# Patient Record
Sex: MALE | Race: WHITE | NOT HISPANIC OR LATINO | Employment: OTHER | URBAN - METROPOLITAN AREA
[De-identification: names, ages, dates, MRNs, and addresses within clinical notes are randomized per-mention and may not be internally consistent; named-entity substitution may affect disease eponyms.]

---

## 2019-11-04 ENCOUNTER — IMMUNIZATION (OUTPATIENT)
Dept: SOCIAL WORK | Facility: CLINIC | Age: 54
End: 2019-11-04
Payer: COMMERCIAL

## 2019-11-04 DIAGNOSIS — Z23 NEED FOR VACCINATION: ICD-10-CM

## 2019-11-04 PROCEDURE — 90686 IIV4 VACC NO PRSV 0.5 ML IM: CPT | Performed by: REGISTERED NURSE

## 2019-11-04 PROCEDURE — 90471 IMMUNIZATION ADMIN: CPT | Performed by: REGISTERED NURSE

## 2022-12-19 ENCOUNTER — HOSPITAL ENCOUNTER (OUTPATIENT)
Dept: RADIOLOGY | Facility: MEDICAL CENTER | Age: 57
End: 2022-12-19

## 2022-12-19 ENCOUNTER — HOSPITAL ENCOUNTER (OUTPATIENT)
Dept: RADIOLOGY | Facility: MEDICAL CENTER | Age: 57
End: 2022-12-19
Attending: NEUROLOGICAL SURGERY

## 2022-12-19 ENCOUNTER — HOSPITAL ENCOUNTER (OUTPATIENT)
Dept: RADIATION ONCOLOGY | Facility: MEDICAL CENTER | Age: 57
End: 2022-12-31
Attending: RADIOLOGY
Payer: COMMERCIAL

## 2022-12-19 VITALS
DIASTOLIC BLOOD PRESSURE: 77 MMHG | BODY MASS INDEX: 31.62 KG/M2 | OXYGEN SATURATION: 96 % | SYSTOLIC BLOOD PRESSURE: 133 MMHG | HEIGHT: 70 IN | TEMPERATURE: 97.2 F | HEART RATE: 73 BPM | WEIGHT: 220.9 LBS

## 2022-12-19 DIAGNOSIS — D32.0 MENINGIOMA, CEREBRAL (HCC): ICD-10-CM

## 2022-12-19 DIAGNOSIS — D32.9 MENINGIOMA (HCC): ICD-10-CM

## 2022-12-19 PROCEDURE — 99205 OFFICE O/P NEW HI 60 MIN: CPT | Performed by: RADIOLOGY

## 2022-12-19 PROCEDURE — 99214 OFFICE O/P EST MOD 30 MIN: CPT | Performed by: RADIOLOGY

## 2022-12-19 RX ORDER — ASPIRIN 81 MG/1
TABLET ORAL DAILY
COMMUNITY

## 2022-12-19 RX ORDER — LYSINE HCL 500 MG
500 TABLET ORAL DAILY
COMMUNITY

## 2022-12-19 RX ORDER — B-COMPLEX WITH VITAMIN C
1 TABLET ORAL DAILY
COMMUNITY

## 2022-12-19 ASSESSMENT — PAIN SCALES - GENERAL: PAINLEVEL: NO PAIN

## 2022-12-19 ASSESSMENT — FIBROSIS 4 INDEX: FIB4 SCORE: 0.7

## 2022-12-19 NOTE — CONSULTS
RADIATION ONCOLOGY CONSULT    DATE OF SERVICE: 12/19/2022    IDENTIFICATION: A 57 y.o. male with likely G1 meningioma of left frontal cortex    He is here at the kind request of Dr. Schaffer    HISTORY OF PRESENT ILLNESS:   Patient originally presented with some vertigo symptoms and had MRI done which showed a left frontal enhancing lesion and type T2 hyperintense in the left frontal cortex.  Patient had surveillance done and had interval MRI done November 2022 which showed growth from 1.5 cm to 2 cm.  Patient was seen by Dr. Schaffer who felt it was in an unsafe location given proximity to motor [facility].  Given growth pattern patient was seen in multidiscipline clinic for consideration of radiosurgery.  Currently patient denies any headaches, nausea, vomiting or other gross neurologic symptoms.  He says his vertigo symptoms have more or less resolved at this point.    PAST MEDICAL HISTORY:  Past Medical History:   Diagnosis Date    Meningioma (HCC)     Splenomegaly     Thrombocytopenia (HCC)        PAST SURGICAL HISTORY:  Past Surgical History:   Procedure Laterality Date    GANGLION EXCISION      OTHER Left     Achilies tendon repair       CURRENT MEDICATIONS:  Current Outpatient Medications   Medication Sig Dispense Refill    aspirin 81 MG EC tablet Take  by mouth every day.      B Complex Vitamins (VITAMIN B COMPLEX) Tab Take 1 Tablet by mouth every day.      vitamin D (VITAMIND D3) 1000 UNIT Tab Take 1,000 Units by mouth every day.      lysine 500 MG Tab Take 500 mg by mouth every day.       No current facility-administered medications for this encounter.       ALLERGIES:    Patient has no allergy information on record.    FAMILY HISTORY:    family history includes Alzheimer's Disease in his father; Cervical Cancer in his sister; Glaucoma in his father; Melanoma (age of onset: 20) in his daughter; Rectal Cancer (age of onset: 52) in his maternal grandfather; Stroke in his mother.    SOCIAL HISTORY:     reports that he  "has never smoked. He has never used smokeless tobacco. He reports current alcohol use of about 0.6 oz per week. He reports that he does not use drugs. He states he lives in [place] with his wife Priti. He is a retired .     REVIEW OF SYSTEMS:  A review of systems for today's date of service was reviewed and uploaded into the electronic medical record.      PHYSICAL EXAM:    ECOG PERFORMANCE STATUS:      12/19/2022     9:55 AM   ECOG Performance Review   ECOG Performance Status Fully active, able to carry on all pre-disease performance without restriction         12/19/2022     9:55 AM   Karnofsky Score   Karnofsky Score 100     /77   Pulse 73   Temp 36.2 °C (97.2 °F)   Ht 1.778 m (5' 10\")   Wt 100 kg (220 lb 14.4 oz)   SpO2 96%   BMI 31.70 kg/m²   Physical Exam  Vitals reviewed.   HENT:      Head: Normocephalic.   Eyes:      Pupils: Pupils are equal, round, and reactive to light.   Cardiovascular:      Rate and Rhythm: Normal rate.   Pulmonary:      Effort: Pulmonary effort is normal.   Abdominal:      General: Abdomen is flat.   Musculoskeletal:         General: Normal range of motion.   Neurological:      Mental Status: He is alert. Mental status is at baseline.   Psychiatric:         Mood and Affect: Mood normal.        LABORATORY DATA:   Lab Results   Component Value Date/Time    WBC 9.6 05/26/2021 0915    WBC 9.1 04/12/2021 0512    WBC 9.1 02/26/2021 1158    HEMOGLOBIN 15.1 05/26/2021 0915    HEMOGLOBIN 14.8 04/12/2021 0512    HEMOGLOBIN 15.2 02/26/2021 1158    HEMATOCRIT 45.9 05/26/2021 0915    HEMATOCRIT 46.1 04/12/2021 0512    HEMATOCRIT 47.0 02/26/2021 1158    MCV 83.9 05/26/2021 0915    MCV 86 04/12/2021 0512    MCV 85 02/26/2021 1158    PLATELETCT 586 (H) 05/26/2021 0915    PLATELETCT 554 (H) 04/12/2021 0512    PLATELETCT 583 (H) 02/26/2021 1158    NEUTS 6.9 04/12/2021 0512    NEUTS 6.6 02/26/2021 1158      Lab Results   Component Value Date/Time    SODIUM 140 04/12/2021 0512    " "SODIUM 144 02/26/2021 1158    POTASSIUM 5.2 04/12/2021 0512    POTASSIUM 4.8 02/26/2021 1158    BUN 21 04/12/2021 0512    BUN 19 02/26/2021 1158    CREATININE 1.23 04/12/2021 0512    CREATININE 1.01 02/26/2021 1158    CALCIUM 9.2 04/12/2021 0512    CALCIUM 9.5 02/26/2021 1158    ALBUMIN 4.4 04/12/2021 0512    ALBUMIN 4.8 02/26/2021 1158    ASTSGOT 22 04/12/2021 0512    ASTSGOT 20 02/26/2021 1158    ALKPHOSPHAT 69 04/12/2021 0512    ALKPHOSPHAT 61 02/26/2021 1158    IFNOTAFR 65 04/12/2021 0512    IFNOTAFR 82 02/26/2021 1158       RADIOLOGY DATA:  Impression    Mild interval enlargement of the frontal parietal convexity   meningioma on the left.     Electronically Signed by: Tanner Harris MD 11/10/2022 11:20 AM  Narrative    EXAM: Brain MR with and without contrast.     TECHNIQUE: Multiplanar, multisequence MR images of the brain were performed in a   routine fashion before and after the intravenous administration of 20 mL of   Multihance contrast.     HISTORY: Benign neoplasm of cerebral meninges (HCC)     COMPARISON: March 12, 2021     FINDINGS:     The brain volume appears normal. Extra-axial mass along the frontoparietal   convexity in the left is again noted, with intermediate signal on T1 and   T2-weighted images, well-circumscribed borders, and homogeneous enhancement   postcontrast. The mass measures 2 cm AP by 1.4 cm transversely, 2.1 cm   craniocaudal. Dural \"tail\" enhancement noted at the margins.   The tumor has enlarged from previous. Associated diffusion restriction.     No diffusion restriction is noted to suggest acute cerebrovascular infarction.   No other abnormal enhancement.     There is no intracranial mass, hemorrhage, midline shift, hydrocephalus, or   extra-axial fluid collection. Flow voids are maintained on T2 weighted images.     Orbits, calvarium, mastoid air cells, and paranasal sinuses are unremarkable.        IMPRESSION:    57 y.o. male with likely G1 meningioma of left frontal " cortex    RECOMMENDATIONS:   I reviewed the role of stereotactic radiosurgery for the treatment of imaging findings consistent with meningioma.  I explained that given interval growth pattern over the last year from 1.5 cm to now 2 cm I do feel that treatment is appropriate to prevent further progression.  We discussed other options which could include observation for surgical resection with Dr. Schaffer.  We discussed that we would offer 25 Holder in 5 fractions radiosurgery using true beam.  We discussed risk and benefits and patient amenable treatment.  Patient will undergo MRI planning scan and CT mapping on January 3 with treatment to start the following week.      Thank you for the opportunity to participate in his care.  If any questions or comments, please do not hesitate in calling.    Orders Placed This Encounter    Referral to Oncology Psychosocial Screening for Distress    aspirin 81 MG EC tablet    B Complex Vitamins (VITAMIN B COMPLEX) Tab    vitamin D (VITAMIND D3) 1000 UNIT Tab    lysine 500 MG Tab

## 2022-12-19 NOTE — PROGRESS NOTES
[facility] note  Interval history  This is a gentleman who presents to [facility] for evaluation for joint treatment by fractionated radiation of a intracranial meningioma now shows growth he was evaluated and deemed appropriate for moving forward with fractionated radiation with 5 fractions.    Interval history no significant changes  Physical examination the patient is not having any focal deficits or changes is neurologically intact  MRI of the brain with and without contrast shows approximately 1 and half to 2 cm left frontal meningioma centered over the left frontal operculum without vasogenic edema or mass-effect.    Assessment and plan at this time we had an extensive discussion with the patient with Dr. Mayo in the room giving him options for radiation therapy versus watchful waiting versus surgical intervention and our collective opinion was that the most appropriate next action would be to move forward with fractionated radiation which patient agreed with he wants to move forward within the next month or so with 5 fractions after the masking the symptoms completed.    Total 30 minutes was spent in the room discussing findings and collaborative discussions about fraction of radiation versus watchful waiting.

## 2022-12-19 NOTE — PROGRESS NOTES
"Patient was seen today in clinic with Dr. Mayo for consult.  Vitals signs and weight were obtained and pain assessment was completed.  Allergies and medications were reviewed with the patient.       Vitals/Pain:  Vitals:    12/19/22 0948   BP: 133/77   Pulse: 73   Temp: 36.2 °C (97.2 °F)   SpO2: 96%   Weight: 100 kg (220 lb 14.4 oz)   Height: 1.778 m (5' 10\")   Pain Score: No pain        Allergies:   Patient has no allergy information on record.    Current Medications:  Current Outpatient Medications   Medication Sig Dispense Refill    aspirin 81 MG EC tablet Take  by mouth every day.      B Complex Vitamins (VITAMIN B COMPLEX) Tab Take 1 Tablet by mouth every day.      vitamin D (VITAMIND D3) 1000 UNIT Tab Take 1,000 Units by mouth every day.      lysine 500 MG Tab Take 500 mg by mouth every day.       No current facility-administered medications for this encounter.           Zoie Delgado R.N.   "

## 2022-12-23 ENCOUNTER — PATIENT OUTREACH (OUTPATIENT)
Dept: ONCOLOGY | Facility: MEDICAL CENTER | Age: 57
End: 2022-12-23

## 2023-01-03 ENCOUNTER — HOSPITAL ENCOUNTER (OUTPATIENT)
Dept: RADIOLOGY | Facility: MEDICAL CENTER | Age: 58
End: 2023-01-03
Attending: RADIOLOGY
Payer: COMMERCIAL

## 2023-01-03 ENCOUNTER — HOSPITAL ENCOUNTER (OUTPATIENT)
Dept: RADIATION ONCOLOGY | Facility: MEDICAL CENTER | Age: 58
End: 2023-01-03

## 2023-01-03 ENCOUNTER — HOSPITAL ENCOUNTER (OUTPATIENT)
Dept: RADIATION ONCOLOGY | Facility: MEDICAL CENTER | Age: 58
End: 2023-01-31
Attending: RADIOLOGY
Payer: COMMERCIAL

## 2023-01-03 DIAGNOSIS — D32.9 MENINGIOMA (HCC): ICD-10-CM

## 2023-01-03 PROCEDURE — 700117 HCHG RX CONTRAST REV CODE 255: Performed by: RADIOLOGY

## 2023-01-03 PROCEDURE — 70553 MRI BRAIN STEM W/O & W/DYE: CPT

## 2023-01-03 PROCEDURE — 77334 RADIATION TREATMENT AID(S): CPT | Mod: 26 | Performed by: RADIOLOGY

## 2023-01-03 PROCEDURE — 77470 SPECIAL RADIATION TREATMENT: CPT | Mod: 26 | Performed by: RADIOLOGY

## 2023-01-03 PROCEDURE — 77263 THER RADIOLOGY TX PLNG CPLX: CPT | Performed by: RADIOLOGY

## 2023-01-03 PROCEDURE — 77470 SPECIAL RADIATION TREATMENT: CPT | Performed by: RADIOLOGY

## 2023-01-03 PROCEDURE — A9579 GAD-BASE MR CONTRAST NOS,1ML: HCPCS | Performed by: RADIOLOGY

## 2023-01-03 PROCEDURE — 77290 THER RAD SIMULAJ FIELD CPLX: CPT | Performed by: RADIOLOGY

## 2023-01-03 PROCEDURE — 77334 RADIATION TREATMENT AID(S): CPT | Performed by: RADIOLOGY

## 2023-01-03 PROCEDURE — 77290 THER RAD SIMULAJ FIELD CPLX: CPT | Mod: 26 | Performed by: RADIOLOGY

## 2023-01-03 RX ADMIN — GADOTERIDOL 15 ML: 279.3 INJECTION, SOLUTION INTRAVENOUS at 18:09

## 2023-01-04 DIAGNOSIS — D32.9 MENINGIOMA (HCC): ICD-10-CM

## 2023-01-04 NOTE — RADIATION COMPLETION NOTES
Clinical Treatment Planning Note    DATE OF SERVICE: 1/3/2023    DIAGNOSIS:  Meningioma      IMAGING REVIEWED:  [] CT     [x] MRI     [] PET/CT     [] BONE SCAN     [] MAMMO     [] OTHER      TREATMENT INTENT:   [x] CURATIVE     [] MAINTENANCE     []  PALLIATIVE      []  SUPPORTIVE     []  PROPHYLACTIC     [] BENIGN     []  CONSOLIDATIVE      [] DEFINITIVE   []  OLOGIMETASTATIC      LINE OF TREATMENT:  [] ADJUVANT   [x] DEFINITIVE   [] NEOADJUVANT   [] RE-TREATMENT      TECHNIQUE PLANNED:  [] IMRT   [] 3D   [] SBRT   [x] SRS/SRT   [] HDR   [] ELECTRON       IMRT JUSTIFICATION:  []   An immediately adjacent area has been previously irradiated and abutting portals must be established with high precision.    []  Dose escalation is planned to deliver radiation doses in excess of those commonly utilized for similar tumors with conventional treatment.    [x]  The target volume is concave or convex, and the critical normal tissues are within or around that convexity or concavity.    []  The target volume is in close proximity to critical structures that must be protected.    []  The volume of interest must be covered with narrow margins to adequately protect  immediately adjacent structures.      FIELDS & BLOCKING:  [x] COMPLEX BLOCKS     []  = 3 TX AREAS     [x]  ARCS     []  CUSTOM SHEILD        []  SIMPLE BLOCK      CHEMOTHERAPY:  []  CONCURRENT     []  INDUCTION     [] SEQUENTIAL     []  <30 DAYS FROM XRT      NOTES:  OAR CONSTRAINTS: (GUIDELINES ONLY NOT ABSOLUTE)    Target Prescribed Coverage   PTV 95% of PTV covered by 100% (Gy) of RX Dose       RAS Goal   Cord Max Dose < 13Gy   Brainstem Max Dose < 12.5Gy   Optic Chiasm Max Dose < 12Gy   R Optic Nerve Max Dose < 12Gy   L Optic Nerve Max Dose < 12Gy   R Eye Max Dose < 5-7Gy   L Eye Max Dose < 5-7Gy   R Lens Max Dose < 5-7Gy   L Lens Max Dose < 5-7Gy

## 2023-01-04 NOTE — RADIATION COMPLETION NOTES
DATE OF SERVICE: 1/3/2023    DIAGNOSIS:  Meningioma    DATE OF SERVICE: 1/3/2023    TYPE OF SIMULATION: Brain    GOAL OF TREATMENT:   [x] Curative  [] Palliative  [] Oligometastatic    CONTRAST:    [] IV Contrast*                POSITION:    [x]  Supine  [] Prone     COMPLEX:  [x] Complex Blocking   [x]Arcs  [] Custom Blocks  [] >3 Sites    PROCEDURE: Patient placed Stereotactic Mask with head in neutral position. CT scan obtained at 1 mm intervals. Images viewed and approved.  Images reconstructed as need.  Image data sets transferred to Brain-Lab for planning.    I have personally reviewed the relevant data, performed the target localization, and determined all relevant factors for this patient’s simulation.    *Omnipaque 80 -100cc IVP in conjunction with 500cc NS       No

## 2023-01-04 NOTE — CT SIMULATION
PATIENT NAME Luca Ocampo   PRIMARY PHYSICIAN Bry Vora [id number]   REFERRING PHYSICIAN No ref. provider found 1965     meningioma       Treatment Planning CT Simulation        Order Questions       Question Answer Comment    Is this for a new course of treatment? Yes     Is this an Addendum? No     Implanted Device/Pacemaker No     Simulation Status Initial     Planned Start Date 1/11/2023     Treatment Site Brain     Laterality Axial     Treatment Technique SRS     Other Technique(s) SRT 5 fx    Treatment Pattern/Frequency Every Other Day     Concurrent Chemotherapy No     CT Technique 3D     Slice Thickness 1mm     Scan Extent Brain     Treatment Device(s) SRS Mask     Patient Attire Gown     Patient Position Supine     Patient Orientation Head First     Arm Position Down     Chin Position Neutral     Treatment Machine TB1 - STx     Treatment Image Guidance CBCT     Frequency (CBCT) Daily     Image Guidance Match Bone     Treatment Planning Image Fusion CT/MR     Special Physics Consult Stereotactic      High Dose     Other Orders Special Tx Procedure Weekly Physics Checl     Weekly Physics Check

## 2023-01-04 NOTE — RADIATION COMPLETION NOTES
PATIENT NAME Luca Ocampo   PRIMARY PHYSICIAN Bry Vora [id number]   REFERRING PHYSICIAN No ref. provider found 1965     DATE OF SERVICE: 1/3/2023    DIAGNOSIS:  Meningioma    SPECIAL TREATMENT PROCEDURE NOTE:  Considerable additional effort required in the management of this case because of administration of Stereotactic Radiotherapy, which may result in increased normal tissue toxicity and require greater effort in contouring and treatment because of greater precision.

## 2023-01-06 PROCEDURE — 77334 RADIATION TREATMENT AID(S): CPT | Performed by: RADIOLOGY

## 2023-01-06 PROCEDURE — 77300 RADIATION THERAPY DOSE PLAN: CPT | Mod: 26 | Performed by: RADIOLOGY

## 2023-01-06 PROCEDURE — 77334 RADIATION TREATMENT AID(S): CPT | Mod: 26 | Performed by: RADIOLOGY

## 2023-01-06 PROCEDURE — 77295 3-D RADIOTHERAPY PLAN: CPT | Mod: 26 | Performed by: RADIOLOGY

## 2023-01-06 PROCEDURE — 77295 3-D RADIOTHERAPY PLAN: CPT | Performed by: RADIOLOGY

## 2023-01-06 PROCEDURE — 77300 RADIATION THERAPY DOSE PLAN: CPT | Performed by: RADIOLOGY

## 2023-01-11 ENCOUNTER — HOSPITAL ENCOUNTER (OUTPATIENT)
Dept: RADIATION ONCOLOGY | Facility: MEDICAL CENTER | Age: 58
End: 2023-01-11
Payer: COMMERCIAL

## 2023-01-11 VITALS
TEMPERATURE: 97.2 F | SYSTOLIC BLOOD PRESSURE: 156 MMHG | OXYGEN SATURATION: 97 % | WEIGHT: 219.3 LBS | HEART RATE: 57 BPM | BODY MASS INDEX: 31.47 KG/M2 | DIASTOLIC BLOOD PRESSURE: 89 MMHG

## 2023-01-11 DIAGNOSIS — D32.9 MENINGIOMA (HCC): ICD-10-CM

## 2023-01-11 LAB
CHEMOTHERAPY INFUSION START DATE: NORMAL
CHEMOTHERAPY RECORDS: 2500
CHEMOTHERAPY RECORDS: 5
CHEMOTHERAPY RECORDS: NORMAL
CHEMOTHERAPY RX CANCER: NORMAL
DATE 1ST CHEMO CANCER: NORMAL
RAD ONC ARIA COURSE LAST TREATMENT DATE: NORMAL
RAD ONC ARIA COURSE TREATMENT ELAPSED DAYS: NORMAL
RAD ONC ARIA REFERENCE POINT DOSAGE GIVEN TO DATE: 5
RAD ONC ARIA REFERENCE POINT DOSAGE GIVEN TO DATE: 6
RAD ONC ARIA REFERENCE POINT ID: NORMAL
RAD ONC ARIA REFERENCE POINT ID: NORMAL
RAD ONC ARIA REFERENCE POINT SESSION DOSAGE GIVEN: 5
RAD ONC ARIA REFERENCE POINT SESSION DOSAGE GIVEN: 6

## 2023-01-11 PROCEDURE — 77435 SBRT MANAGEMENT: CPT | Performed by: RADIOLOGY

## 2023-01-11 PROCEDURE — 77280 THER RAD SIMULAJ FIELD SMPL: CPT | Mod: 26 | Performed by: RADIOLOGY

## 2023-01-11 PROCEDURE — 77280 THER RAD SIMULAJ FIELD SMPL: CPT | Performed by: RADIOLOGY

## 2023-01-11 PROCEDURE — 77373 STRTCTC BDY RAD THER TX DLVR: CPT | Performed by: RADIOLOGY

## 2023-01-11 ASSESSMENT — FIBROSIS 4 INDEX: FIB4 SCORE: 0.7

## 2023-01-11 NOTE — ON TREATMENT VISIT
"ON TREATMENT  NOTE  RADIATION ONCOLOGY DEPARTMENT    Patient name:  Luca Ocampo    Primary Physician:  Bry Vora M.D. MRN: [id number]  CSN: [id number]   Referring physician:  No ref. provider found   : 1965, 57 y.o.     ENCOUNTER DATE:  2023      DIAGNOSIS:  Meningioma    TREATMENT SUMMARY:  Course First Treatment Date 2023  Course Last Treatment Date 2023  Radiation Treatments       Active   Plans   1 Lesion SRT   Most recent treatment: Dose planned: 500 cGy (fraction 1 of 5 on 2023)   Total: Dose planned: 2,500 cGy   Elapsed Days: 0 @ [id number]           Historical   No historical radiation treatments to show.                 SUBJECTIVE:  No complaints    VITAL SIGNS:      2022   Vitals   SYSTOLIC 156 133   DIASTOLIC 89 77   Pulse 57 73   Temperature 36.2 °C (97.2 °F) 36.2 °C (97.2 °F)   Weight 219.3 220.9   Height  1.778 m (5' 10\")   BMI 31.47 kg/m2 31.7 kg/m2   Pulse Oximetry 97 % 96 %       Multiple values from one day are sorted in reverse-chronological order     KPS: 100, Fully active, able to carry on all pre-disease performed without restriction (ECOG equivalent 0)         2022   Pain Assessment   Pain Score NO PAIN       Multiple values from one day are sorted in reverse-chronological order          PHYSICAL EXAM:  Physical Exam         2023   Toxicity Assessment   Toxicity Assessment Brain   Fatigue (lethargy, malaise, asthenia) None   Radiation Dermatitis None   Nausea None   Mouth Dryness Normal   Headache None   External Auditory Canal Normal   Inner Ear/Hearing Normal   Middle Ear/Hearing Normal   Dizziness/lightheadedness None   Memory loss Normal   Neuropathy - Motor Normal   Seizure None   Vertigo None       Multiple values from one day are sorted in reverse-chronological order       CURRENT MEDICATIONS:    Current Outpatient Medications:     aspirin 81 MG EC tablet, Take  by mouth every day., Disp: , Rfl:     B Complex Vitamins " (VITAMIN B COMPLEX) Tab, Take 1 Tablet by mouth every day., Disp: , Rfl:     vitamin D (VITAMIND D3) 1000 UNIT Tab, Take 1,000 Units by mouth every day., Disp: , Rfl:     lysine 500 MG Tab, Take 500 mg by mouth every day., Disp: , Rfl:     LABORATORY DATA:   Lab Results   Component Value Date/Time    SODIUM 140 04/12/2021 05:12 AM    POTASSIUM 5.2 04/12/2021 05:12 AM    CHLORIDE 105 04/12/2021 05:12 AM    CO2 21 04/12/2021 05:12 AM    GLUCOSE 94 04/12/2021 05:12 AM    BUN 21 04/12/2021 05:12 AM    CREATININE 1.23 04/12/2021 05:12 AM    BUNCREATRAT 17 04/12/2021 05:12 AM       Lab Results   Component Value Date/Time    WBC 9.6 05/26/2021 09:15 AM    RBC 5.46 05/26/2021 09:15 AM    HEMOGLOBIN 15.1 05/26/2021 09:15 AM    HEMATOCRIT 45.9 05/26/2021 09:15 AM    MCV 83.9 05/26/2021 09:15 AM    MCH 27.7 (L) 05/26/2021 09:15 AM    MCHC 33.0 (L) 05/26/2021 09:15 AM    PLATELETCT 586 (H) 05/26/2021 09:15 AM         RADIOLOGY DATA:  MR-BRAIN-WITH & W/O    Result Date: 1/4/2023  Stable appearance of the left anterior parietal convexity dural based mass, most likely a meningioma. There is slight mass effect upon the left parietal cortex without associated vasogenic edema.      IMPRESSION:  Cancer Staging   No matching staging information was found for the patient.    PLAN:  No change in treatment plan    Disposition:  Treatment plan and imaging reviewed. Questions answered. Continue therapy outlined.  Repeat MRI in  6 months.    David Mayo M.D.    Orders Placed This Encounter    MR-BRAIN-WITH & W/O

## 2023-01-13 ENCOUNTER — HOSPITAL ENCOUNTER (OUTPATIENT)
Dept: RADIATION ONCOLOGY | Facility: MEDICAL CENTER | Age: 58
End: 2023-01-13
Payer: COMMERCIAL

## 2023-01-13 LAB
CHEMOTHERAPY INFUSION START DATE: NORMAL
CHEMOTHERAPY RECORDS: 2500
CHEMOTHERAPY RECORDS: 5
CHEMOTHERAPY RECORDS: NORMAL
CHEMOTHERAPY RX CANCER: NORMAL
DATE 1ST CHEMO CANCER: NORMAL
RAD ONC ARIA COURSE LAST TREATMENT DATE: NORMAL
RAD ONC ARIA COURSE TREATMENT ELAPSED DAYS: NORMAL
RAD ONC ARIA REFERENCE POINT DOSAGE GIVEN TO DATE: 10
RAD ONC ARIA REFERENCE POINT DOSAGE GIVEN TO DATE: 11.99
RAD ONC ARIA REFERENCE POINT ID: NORMAL
RAD ONC ARIA REFERENCE POINT ID: NORMAL
RAD ONC ARIA REFERENCE POINT SESSION DOSAGE GIVEN: 5
RAD ONC ARIA REFERENCE POINT SESSION DOSAGE GIVEN: 6

## 2023-01-13 PROCEDURE — 77280 THER RAD SIMULAJ FIELD SMPL: CPT | Mod: 26 | Performed by: RADIOLOGY

## 2023-01-13 PROCEDURE — 77373 STRTCTC BDY RAD THER TX DLVR: CPT | Performed by: RADIOLOGY

## 2023-01-13 PROCEDURE — 77280 THER RAD SIMULAJ FIELD SMPL: CPT | Performed by: RADIOLOGY

## 2023-01-16 ENCOUNTER — HOSPITAL ENCOUNTER (OUTPATIENT)
Dept: RADIATION ONCOLOGY | Facility: MEDICAL CENTER | Age: 58
End: 2023-01-16
Payer: COMMERCIAL

## 2023-01-16 LAB
CHEMOTHERAPY INFUSION START DATE: NORMAL
CHEMOTHERAPY RECORDS: 2500
CHEMOTHERAPY RECORDS: 5
CHEMOTHERAPY RECORDS: NORMAL
CHEMOTHERAPY RX CANCER: NORMAL
DATE 1ST CHEMO CANCER: NORMAL
RAD ONC ARIA COURSE LAST TREATMENT DATE: NORMAL
RAD ONC ARIA COURSE TREATMENT ELAPSED DAYS: NORMAL
RAD ONC ARIA REFERENCE POINT DOSAGE GIVEN TO DATE: 15
RAD ONC ARIA REFERENCE POINT DOSAGE GIVEN TO DATE: 17.99
RAD ONC ARIA REFERENCE POINT ID: NORMAL
RAD ONC ARIA REFERENCE POINT ID: NORMAL
RAD ONC ARIA REFERENCE POINT SESSION DOSAGE GIVEN: 5
RAD ONC ARIA REFERENCE POINT SESSION DOSAGE GIVEN: 6

## 2023-01-16 PROCEDURE — 77280 THER RAD SIMULAJ FIELD SMPL: CPT | Performed by: RADIOLOGY

## 2023-01-16 PROCEDURE — 77280 THER RAD SIMULAJ FIELD SMPL: CPT | Mod: 26 | Performed by: RADIOLOGY

## 2023-01-16 PROCEDURE — 77373 STRTCTC BDY RAD THER TX DLVR: CPT | Performed by: RADIOLOGY

## 2023-01-16 PROCEDURE — 77336 RADIATION PHYSICS CONSULT: CPT | Performed by: RADIOLOGY

## 2023-01-17 ENCOUNTER — HOSPITAL ENCOUNTER (OUTPATIENT)
Dept: RADIATION ONCOLOGY | Facility: MEDICAL CENTER | Age: 58
End: 2023-01-17
Payer: COMMERCIAL

## 2023-01-17 LAB
CHEMOTHERAPY INFUSION START DATE: NORMAL
CHEMOTHERAPY RECORDS: 2500
CHEMOTHERAPY RECORDS: 5
CHEMOTHERAPY RECORDS: NORMAL
CHEMOTHERAPY RX CANCER: NORMAL
DATE 1ST CHEMO CANCER: NORMAL
RAD ONC ARIA COURSE LAST TREATMENT DATE: NORMAL
RAD ONC ARIA COURSE TREATMENT ELAPSED DAYS: NORMAL
RAD ONC ARIA REFERENCE POINT DOSAGE GIVEN TO DATE: 20
RAD ONC ARIA REFERENCE POINT DOSAGE GIVEN TO DATE: 23.98
RAD ONC ARIA REFERENCE POINT ID: NORMAL
RAD ONC ARIA REFERENCE POINT ID: NORMAL
RAD ONC ARIA REFERENCE POINT SESSION DOSAGE GIVEN: 5
RAD ONC ARIA REFERENCE POINT SESSION DOSAGE GIVEN: 6

## 2023-01-17 PROCEDURE — 77280 THER RAD SIMULAJ FIELD SMPL: CPT | Mod: 26 | Performed by: RADIOLOGY

## 2023-01-17 PROCEDURE — 77373 STRTCTC BDY RAD THER TX DLVR: CPT | Performed by: RADIOLOGY

## 2023-01-17 PROCEDURE — 77280 THER RAD SIMULAJ FIELD SMPL: CPT | Performed by: RADIOLOGY

## 2023-01-17 NOTE — PROCEDURES
NEUROSURGERY STEREOTACTIC PROCEDURE NOTE    Patient name:  Luca Ocampo    Primary Physician:  Bry Vora M.D. MRN: [id number]  CSN: [id number]   Referring physician:  No ref. provider found : 1965, 57 y.o.     ENCOUNTER DATE:  2023    DIAGNOSIS:  No matching staging information was found for the patient.    PROCEDURE:   FRANK-BEAM STEREOTACTIC RADIOTHERAPY    CLASSIFICATION:  [x]SIMPLE  []COMPLEX    ISOCENTERS:  [x]1 []2 []3+    TREATMENT SUMMARY:  Aria Treatment Information          2023   Aria Course Treatment Dates   Course First Treatment Date 2023     Course Last Treatment Date 2023     Aria Treatment Summary   1 Lesion SRT  Plan from Course C1_SRT   Fraction 4 of 5   Elapsed Course Days 6 @ [id number]   Prescribed Fraction Dose 500 cGy   Prescribed Total Dose 2,500 cGy   1 Lesion SRT  Reference Point from Course C1_SRT   Elapsed Course Days 6 @ [id number]   Session Dose 500 cGy   Total Dose 2,000 cGy   1 Lesion SRT CP  Reference Point from Course C1_SRT   Elapsed Course Days 6 @ [id number]   Session Dose 600 cGy   Total Dose 2,398 cGy          After discussion of risks, benefits, and alternatives, the patient elected to proceed with stereotactic radiosurgery. The patient underwent a planning session consisting of radiosurgery protocol MRI, as well as a CT scan in their mask. The image data sets were fused for treatment planning. The lesion was contoured, critical structures were contoured as well, including brain stem, optic nerves, optic chiasm, globes, etc. A plan was then created with the goal of optimizing radiation to the lesion and minimizing radiation to critical structures. Once a plan was approved by all involved parties, the patient was brought to the treatment room, attached to the table, and aligned. When the alignment was confirmed, the patient proceeded with treatment.

## 2023-01-17 NOTE — PROCEDURES
DATE OF SERVICE: 1/17/2023    DIAGNOSIS:  No matching staging information was found for the patient.     TREATMENT:  Radiation Therapy Episodes       Active Episodes       Radiation Therapy: SRS (1/11/2023)                   Radiation Treatments         Plan Last Treated On Elapsed Days Fractions Treated Prescribed Fraction Dose (cGy) Prescribed Total Dose (cGy)    1 Lesion SRT 1/17/2023 6 @ [id number] 4 of 5 500 2,500                  Reference Point Last Treated On Elapsed Days Most Recent Session Dose (cGy) Total Dose (cGy)    1 Lesion SRT 1/17/2023 6 @ [id number] 500 2,000    1 Lesion SRT CP 1/17/2023 6 @ [id number] 600 2,398                            STEREOTACTIC PROCEDURE NOTE:    Called by Truebeam machine to verify treatment parameters including:  treatment site, treatment dose, and treatment setup prior to stereotactic treatment..    Patient was placed in the treatment position with use of immobilization device and  laser guidance. CBCT images were acquired for target localization.  Images were reviewed in the axial, coronal, and saggital views and shifts were made as necessary to ensure that patient position matched simulation position.      Treatment delivered per  prescription.  The medical physicist was present throughout the set-up, verification and treatment delivery to oversee the procedure and ensure all parameters agreed with the computerized plan.    I have personally reviewed the relevant data, performed the target localization, and determined all relevant factors for this patient’s simulation.

## 2023-01-18 ENCOUNTER — HOSPITAL ENCOUNTER (OUTPATIENT)
Dept: RADIATION ONCOLOGY | Facility: MEDICAL CENTER | Age: 58
End: 2023-01-18
Payer: COMMERCIAL

## 2023-01-18 VITALS
BODY MASS INDEX: 31.82 KG/M2 | OXYGEN SATURATION: 96 % | SYSTOLIC BLOOD PRESSURE: 141 MMHG | DIASTOLIC BLOOD PRESSURE: 84 MMHG | HEART RATE: 56 BPM | WEIGHT: 221.8 LBS | TEMPERATURE: 97.1 F

## 2023-01-18 DIAGNOSIS — D32.9 MENINGIOMA (HCC): ICD-10-CM

## 2023-01-18 LAB
CHEMOTHERAPY INFUSION START DATE: NORMAL
CHEMOTHERAPY INFUSION START DATE: NORMAL
CHEMOTHERAPY INFUSION STOP DATE: NORMAL
CHEMOTHERAPY RECORDS: 2500
CHEMOTHERAPY RECORDS: 2500
CHEMOTHERAPY RECORDS: 5
CHEMOTHERAPY RECORDS: 5
CHEMOTHERAPY RECORDS: NORMAL
CHEMOTHERAPY RX CANCER: NORMAL
CHEMOTHERAPY RX CANCER: NORMAL
DATE 1ST CHEMO CANCER: NORMAL
DATE 1ST CHEMO CANCER: NORMAL
RAD ONC ARIA COURSE LAST TREATMENT DATE: NORMAL
RAD ONC ARIA COURSE LAST TREATMENT DATE: NORMAL
RAD ONC ARIA COURSE TREATMENT ELAPSED DAYS: NORMAL
RAD ONC ARIA COURSE TREATMENT ELAPSED DAYS: NORMAL
RAD ONC ARIA REFERENCE POINT DOSAGE GIVEN TO DATE: 25
RAD ONC ARIA REFERENCE POINT DOSAGE GIVEN TO DATE: 25
RAD ONC ARIA REFERENCE POINT DOSAGE GIVEN TO DATE: 29.98
RAD ONC ARIA REFERENCE POINT DOSAGE GIVEN TO DATE: 29.98
RAD ONC ARIA REFERENCE POINT ID: NORMAL
RAD ONC ARIA REFERENCE POINT SESSION DOSAGE GIVEN: 5
RAD ONC ARIA REFERENCE POINT SESSION DOSAGE GIVEN: 6

## 2023-01-18 PROCEDURE — 77280 THER RAD SIMULAJ FIELD SMPL: CPT | Mod: 26 | Performed by: RADIOLOGY

## 2023-01-18 PROCEDURE — 77373 STRTCTC BDY RAD THER TX DLVR: CPT | Performed by: RADIOLOGY

## 2023-01-18 PROCEDURE — 77280 THER RAD SIMULAJ FIELD SMPL: CPT | Performed by: RADIOLOGY

## 2023-01-18 RX ORDER — DEXAMETHASONE 2 MG/1
2 TABLET ORAL
Qty: 30 TABLET | Refills: 0 | Status: SHIPPED | OUTPATIENT
Start: 2023-01-18 | End: 2023-01-28

## 2023-01-18 ASSESSMENT — PAIN SCALES - GENERAL: PAINLEVEL: 3=SLIGHT PAIN

## 2023-01-18 ASSESSMENT — FIBROSIS 4 INDEX: FIB4 SCORE: 0.7

## 2023-01-18 NOTE — ON TREATMENT VISIT
"ON TREATMENT  NOTE  RADIATION ONCOLOGY DEPARTMENT    Patient name:  Luca Ocampo    Primary Physician:  Bry Vora M.D. MRN: [id number]  CSN: [id number]   Referring physician:  No ref. provider found   : 1965, 57 y.o.     ENCOUNTER DATE:  2023      DIAGNOSIS:  Meningioma s/p SRT    TREATMENT SUMMARY:  Course First Treatment Date 2023  Course Last Treatment Date 2023  Radiation Treatments       Active   Plans   1 Lesion SRT   Most recent treatment: Dose planned: 500 cGy (fraction 5 of 5 on 2023)   Total: Dose planned: 2,500 cGy   Elapsed Days: 7 @ [id number]           Historical   No historical radiation treatments to show.                 SUBJECTIVE:  Mild headache and ringing in ear    VITAL SIGNS:      2022   Vitals   SYSTOLIC 141 156 133   DIASTOLIC 84 89 77   Pulse 56 57 73   Temperature 36.2 °C (97.1 °F) 36.2 °C (97.2 °F) 36.2 °C (97.2 °F)   Weight 221.8 219.3 220.9   Height   1.778 m (5' 10\")   BMI 31.82 kg/m2 31.47 kg/m2 31.7 kg/m2   Pulse Oximetry 96 % 97 % 96 %       Multiple values from one day are sorted in reverse-chronological order     KPS: 100, Fully active, able to carry on all pre-disease performed without restriction (ECOG equivalent 0)         2022   Pain Assessment   Pain Score 3=SLIGHT EMMANUEL NO PAIN   Pain Loc HEAD        Multiple values from one day are sorted in reverse-chronological order          PHYSICAL EXAM:  Physical Exam         2023   Toxicity Assessment   Toxicity Assessment Brain Brain   Fatigue (lethargy, malaise, asthenia) None None   Radiation Dermatitis None None   Nausea None None   Mouth Dryness Normal Normal   Headache Mild pain not interfering with function None   External Auditory Canal External otitis with erythema or dry desquamation Normal   Inner Ear/Hearing Hearing loss on audiometry only Normal   Middle Ear/Hearing Normal Normal   Dizziness/lightheadedness None None   Memory loss " Normal Normal   Neuropathy - Motor Normal Normal   Seizure None None   Vertigo None None       Multiple values from one day are sorted in reverse-chronological order       CURRENT MEDICATIONS:    Current Outpatient Medications:     dexamethasone (DECADRON) 2 MG tablet, Take 1 Tablet by mouth 3 times a day with meals for 10 days., Disp: 30 Tablet, Rfl: 0    aspirin 81 MG EC tablet, Take  by mouth every day., Disp: , Rfl:     B Complex Vitamins (VITAMIN B COMPLEX) Tab, Take 1 Tablet by mouth every day., Disp: , Rfl:     vitamin D (VITAMIND D3) 1000 UNIT Tab, Take 1,000 Units by mouth every day., Disp: , Rfl:     lysine 500 MG Tab, Take 500 mg by mouth every day., Disp: , Rfl:     LABORATORY DATA:   Lab Results   Component Value Date/Time    SODIUM 140 04/12/2021 05:12 AM    POTASSIUM 5.2 04/12/2021 05:12 AM    CHLORIDE 105 04/12/2021 05:12 AM    CO2 21 04/12/2021 05:12 AM    GLUCOSE 94 04/12/2021 05:12 AM    BUN 21 04/12/2021 05:12 AM    CREATININE 1.23 04/12/2021 05:12 AM    BUNCREATRAT 17 04/12/2021 05:12 AM       Lab Results   Component Value Date/Time    WBC 9.6 05/26/2021 09:15 AM    RBC 5.46 05/26/2021 09:15 AM    HEMOGLOBIN 15.1 05/26/2021 09:15 AM    HEMATOCRIT 45.9 05/26/2021 09:15 AM    MCV 83.9 05/26/2021 09:15 AM    MCH 27.7 (L) 05/26/2021 09:15 AM    MCHC 33.0 (L) 05/26/2021 09:15 AM    PLATELETCT 586 (H) 05/26/2021 09:15 AM         RADIOLOGY DATA:  MR-BRAIN-WITH & W/O    Result Date: 1/4/2023  Stable appearance of the left anterior parietal convexity dural based mass, most likely a meningioma. There is slight mass effect upon the left parietal cortex without associated vasogenic edema.      IMPRESSION:  Cancer Staging   No matching staging information was found for the patient.    PLAN:  No change in treatment plan    Disposition:  Treatment plan and imaging reviewed. Questions answered. Continue therapy outlined. Given steroid 10 day course, will call if he takes it. MRI brain in 6 months.     David SCHREIBER  JULIO Mayo    Orders Placed This Encounter    dexamethasone (DECADRON) 2 MG tablet

## 2023-01-20 NOTE — RADIATION COMPLETION NOTES
END OF TREATMENT SUMMARY    Patient name:  Luca Ocampo    Primary Physician:  Bry Vora M.D. MRN: [id number]  CSN: [id number]   Referring physician:  Dr. Schaffer : 1965, 57 y.o.       TREATMENT SUMMARY:        Course First Treatment Date 2023    Course Last Treatment Date 2023   Course Elapsed Days 7 @ [id number]   Course Intent Curative     Radiation Therapy Episodes       Active Episodes       Radiation Therapy: SRS (2023)                   Radiation Treatments         Plan Last Treated On Elapsed Days Fractions Treated Prescribed Fraction Dose (cGy) Prescribed Total Dose (cGy)    1 Lesion SRT 2023 7 @ [id number] 5 of 5 500 2,500                  Reference Point Last Treated On Elapsed Days Most Recent Session Dose (cGy) Total Dose (cGy)    1 Lesion SRT 2023 7 @ [id number] -- 2,500    1 Lesion SRT CP 2023 7 @ [id number] -- 2,998                                     STAGE: G1 meningioma     TREATMENT INDICATION:   palliative     CONCURRENT SYSTEMIC TREATMENT:   no     RT COURSE DISCONTINUED EARLY:   No     PATIENT EXPERIENCE:       2023   Toxicity Assessment   Toxicity Assessment Brain Brain   Fatigue (lethargy, malaise, asthenia) None None   Radiation Dermatitis None None   Nausea None None   Mouth Dryness Normal Normal   Headache Mild pain not interfering with function None   External Auditory Canal External otitis with erythema or dry desquamation Normal   Inner Ear/Hearing Hearing loss on audiometry only Normal   Middle Ear/Hearing Normal Normal   Dizziness/lightheadedness None None   Memory loss Normal Normal   Neuropathy - Motor Normal Normal   Seizure None None   Vertigo None None       Multiple values from one day are sorted in reverse-chronological order        FOLLOW-UP PLAN:   6 Weeks     COMMENT:          ANATOMIC TARGET SUMMARY    ANATOMIC TARGET MODALITY TECHNIQUE   brain   External beam, photons FSRT            COMMENT:          DIAGRAMS:      DOSE VOLUME HISTOGRAMS:

## 2023-02-13 ENCOUNTER — HOSPITAL ENCOUNTER (OUTPATIENT)
Dept: RADIATION ONCOLOGY | Facility: MEDICAL CENTER | Age: 58
End: 2023-02-28
Attending: RADIOLOGY
Payer: COMMERCIAL

## 2023-02-13 NOTE — ON TREATMENT VISIT
Telephone Appointment Visit   This telephone visit was initiated by the patient and they verbally consented.    Reason for Call:  Symptom Follow-up    HPI:    Patient originally presented with some vertigo symptoms and had MRI done which showed a left frontal enhancing lesion and type T2 hyperintense in the left frontal cortex.  Patient had surveillance done and had interval MRI done November 2022 which showed growth from 1.5 cm to 2 cm.  Patient was seen by Dr. Schaffer who felt it was in an unsafe location given proximity to motor strip.  Given growth pattern patient was seen in multidiscipline clinic for consideration of radiosurgery.  Currently patient denies any headaches, nausea, vomiting or other gross neurologic symptoms.  He says his vertigo symptoms have more or less resolved at this point.    Interval History:  Patient completed 25Gy in 5 fractions 1/18/23. Dex taper given headaches improving fatigue improving.     Labs / Images Reviewed:   none    Assessment and Plan:   Roxie s/p SRT      Follow-up: 6 months MRI brain    Total Time Spent (mins): 5 mins    David Mayo M.D.

## 2023-07-07 ENCOUNTER — HOSPITAL ENCOUNTER (OUTPATIENT)
Dept: RADIOLOGY | Facility: MEDICAL CENTER | Age: 58
End: 2023-07-07
Attending: RADIOLOGY
Payer: COMMERCIAL

## 2023-07-07 DIAGNOSIS — D32.9 MENINGIOMA (HCC): ICD-10-CM

## 2023-07-07 PROCEDURE — 70553 MRI BRAIN STEM W/O & W/DYE: CPT

## 2023-07-07 PROCEDURE — 700117 HCHG RX CONTRAST REV CODE 255: Performed by: RADIOLOGY

## 2023-07-07 PROCEDURE — A9579 GAD-BASE MR CONTRAST NOS,1ML: HCPCS | Performed by: RADIOLOGY

## 2023-07-07 RX ADMIN — GADOTERIDOL 20 ML: 279.3 INJECTION, SOLUTION INTRAVENOUS at 13:41

## 2023-07-18 ASSESSMENT — LIFESTYLE VARIABLES
SMOKING_STATUS: NO
TOBACCO_USE: NO

## 2023-07-21 ENCOUNTER — HOSPITAL ENCOUNTER (OUTPATIENT)
Dept: RADIATION ONCOLOGY | Facility: MEDICAL CENTER | Age: 58
End: 2023-07-31
Attending: RADIOLOGY
Payer: COMMERCIAL

## 2023-07-21 DIAGNOSIS — D32.9 MENINGIOMA (HCC): ICD-10-CM

## 2023-07-21 PROCEDURE — 99214 OFFICE O/P EST MOD 30 MIN: CPT | Performed by: RADIOLOGY

## 2023-07-21 PROCEDURE — 99212 OFFICE O/P EST SF 10 MIN: CPT | Performed by: RADIOLOGY

## 2023-07-21 ASSESSMENT — FIBROSIS 4 INDEX: FIB4 SCORE: 0.87

## 2023-07-21 ASSESSMENT — PAIN SCALES - GENERAL: PAINLEVEL: NO PAIN

## 2023-07-21 NOTE — PROGRESS NOTES
RADIATION ONCOLOGY FOLLOW-UP    Patient name:  Luca Ocampo    Primary Physician:  Jose Juan Marroquin M.D. MRN: [id number]  [facility]: [id number]   Referring physician:  Jose Juan Schaffer M.D.  : 1965, 58 y.o.     DATE OF SERVICE: 2023    IDENTIFICATION:   A 58 y.o. male with left frontal meningioma s/p SRT 23    RADIATION SUMMARY:  Radiation Oncology          2023   Aria Course Treatment Dates   Course First Treatment Date 2023    Course Last Treatment Date 2023    Aria Treatment Summary   1 Lesion SRT  Plan from Course C1_SRT   Fraction 4 of 5 5 of 5    5 of 5   Elapsed Course Days 6 @ [id number] 7 @ [id number]    7 @ [id number]   Prescribed Fraction Dose 500 cGy 500 cGy    500 cGy   Prescribed Total Dose 2,500 cGy 2,500 cGy    2,500 cGy   1 Lesion SRT  Reference Point from Course C1_SRT   Elapsed Course Days 6 @ [id number] 7 @ [id number]    7 @ [id number]   Session Dose 500 cGy 500 cGy    --   Total Dose 2,000 cGy 2,500 cGy    2,500 cGy   1 Lesion SRT CP  Reference Point from Course C1_SRT   Elapsed Course Days 6 @ [id number] 7 @ [id number]    7 @ [id number]   Session Dose 600 cGy 600 cGy    --   Total Dose 2,398 cGy 2,998 cGy    2,998 cGy      Details          More values are hidden. Newest values shown. Go to activity for more data.                HISTORY OF PRESENT ILLNESS:    Patient originally presented with some vertigo symptoms and had MRI done which showed a left frontal enhancing lesion and type T2 hyperintense in the left frontal cortex.  Patient had surveillance done and had interval MRI done 2022 which showed growth from 1.5 cm to 2 cm.  Patient was seen by Dr. Schaffer who felt it was in an unsafe location given proximity to [facility].  Given growth pattern patient was seen in multidiscipline clinic for consideration of radiosurgery.  Currently patient denies any headaches, nausea, vomiting or other gross neurologic  symptoms.  He says his vertigo symptoms have more or less resolved at this point.    2/13/23  Patient completed 25Gy in 5 fractions 1/18/23. Dex taper given headaches improving fatigue improving    INTERVAL HISTORY:  Patient doing well has some tinnitus for which he sees his ENT doctor for that for started after radiation.  Has occasional blurred vision.  Overall doing well had MRI brain which shows stable mass no vasogenic edema.      PROBLEM LIST:  Patient Active Problem List   Diagnosis    Meningioma (HCC)       CURRENT MEDICATIONS:  Current Outpatient Medications   Medication Sig Dispense Refill    aspirin 81 MG EC tablet Take  by mouth every day.      B Complex Vitamins (VITAMIN B COMPLEX) Tab Take 1 Tablet by mouth every day.      vitamin D (VITAMIND D3) 1000 UNIT Tab Take 1,000 Units by mouth every day.      lysine 500 MG Tab Take 500 mg by mouth every day.       No current facility-administered medications for this encounter.       ALLERGIES:  Patient has no known allergies.    REVIEW OF SYSTEMS:    A complete review of system taken. Pertinent items in HPI.  All others negative.    PHYSICAL EXAM:  PERFORMANCE STATUS:  ECOG0    BP (P) 128/81 (BP Location: Right arm, Patient Position: Sitting)   Pulse (!) (P) 7   Temp (P) 36.3 °C (97.3 °F) (Temporal)   Wt (P) 100 kg (220 lb 10.9 oz)   SpO2 (P) 95%   BMI (P) 31.66 kg/m²   Physical Exam  Vitals reviewed.   Eyes:      Pupils: Pupils are equal, round, and reactive to light.   Cardiovascular:      Rate and Rhythm: Normal rate.   Pulmonary:      Effort: Pulmonary effort is normal.   Abdominal:      General: Abdomen is flat.   Musculoskeletal:         General: Normal range of motion.   Neurological:      Mental Status: He is alert. Mental status is at baseline.   Psychiatric:         Mood and Affect: Mood normal.         LABORATORY DATA:   Lab Results   Component Value Date/Time    WBC 12.0 (H) 06/29/2023 04:43 AM    RBC 5.40 06/29/2023 04:43 AM    HEMOGLOBIN  15.8 06/29/2023 04:43 AM    HEMATOCRIT 47.2 06/29/2023 04:43 AM    MCV 87 06/29/2023 04:43 AM    MCH 29.3 06/29/2023 04:43 AM    MCHC 33.5 06/29/2023 04:43 AM    RDW 18.1 (H) 06/29/2023 04:43 AM    PLATELETCT 459 (H) 06/29/2023 04:43 AM    MPV 7.2 05/26/2021 09:15 AM    NEUTSPOLYS 72 06/29/2023 04:43 AM    LYMPHOCYTES 16 06/29/2023 04:43 AM    MONOCYTES 6 06/29/2023 04:43 AM    EOSINOPHILS 3 06/29/2023 04:43 AM    BASOPHILS 1 06/29/2023 04:43 AM      Lab Results   Component Value Date/Time    SODIUM 143 06/29/2023 04:43 AM    POTASSIUM 4.7 06/29/2023 04:43 AM    CHLORIDE 106 06/29/2023 04:43 AM    CO2 22 06/29/2023 04:43 AM    GLUCOSE 97 06/29/2023 04:43 AM    BUN 20 06/29/2023 04:43 AM    CREATININE 0.80 06/29/2023 04:43 AM    BUNCREATRAT 25 (H) 06/29/2023 04:43 AM         RADIOLOGY DATA:  MR-BRAIN-WITH & W/O    Result Date: 7/8/2023  Stable appearance of left parietal convexity meningioma with slight mass effect upon the underlying parietal lobe but no associated vasogenic edema.      IMPRESSION:    58 y.o. male with left frontal meningioma s/p SRT 1/18/23    CANCER STATUS:  Disease Stable    RECOMMENDATIONS:   Patient doing well with stable meningioma.  Recommend repeat MRI in 1 year.    Thank you for the opportunity to participate in his care.  If any questions or comments, please do not hesitate in calling.    Orders Placed This Encounter    MR-BRAIN-WITH & W/O

## 2024-07-30 ENCOUNTER — HOSPITAL ENCOUNTER (OUTPATIENT)
Dept: RADIOLOGY | Facility: MEDICAL CENTER | Age: 59
End: 2024-07-30
Attending: RADIOLOGY
Payer: COMMERCIAL

## 2024-07-30 DIAGNOSIS — D32.9 MENINGIOMA (HCC): ICD-10-CM

## 2024-07-30 PROCEDURE — 700117 HCHG RX CONTRAST REV CODE 255: Mod: JZ | Performed by: RADIOLOGY

## 2024-07-30 PROCEDURE — A9579 GAD-BASE MR CONTRAST NOS,1ML: HCPCS | Mod: JZ | Performed by: RADIOLOGY

## 2024-07-30 PROCEDURE — 70553 MRI BRAIN STEM W/O & W/DYE: CPT

## 2024-07-30 RX ADMIN — GADOTERIDOL 20 ML: 279.3 INJECTION, SOLUTION INTRAVENOUS at 18:40

## 2024-08-09 ENCOUNTER — HOSPITAL ENCOUNTER (OUTPATIENT)
Dept: RADIATION ONCOLOGY | Facility: MEDICAL CENTER | Age: 59
End: 2024-08-09
Attending: RADIOLOGY
Payer: COMMERCIAL

## 2024-08-09 VITALS
TEMPERATURE: 98.1 F | DIASTOLIC BLOOD PRESSURE: 78 MMHG | OXYGEN SATURATION: 94 % | WEIGHT: 214.51 LBS | SYSTOLIC BLOOD PRESSURE: 136 MMHG | BODY MASS INDEX: 30.78 KG/M2 | HEART RATE: 74 BPM

## 2024-08-09 DIAGNOSIS — D32.9 MENINGIOMA (HCC): ICD-10-CM

## 2024-08-09 PROCEDURE — 99213 OFFICE O/P EST LOW 20 MIN: CPT | Performed by: RADIOLOGY

## 2024-08-09 PROCEDURE — 99212 OFFICE O/P EST SF 10 MIN: CPT | Performed by: RADIOLOGY

## 2024-08-09 ASSESSMENT — PAIN SCALES - GENERAL: PAINLEVEL: NO PAIN

## 2024-08-09 ASSESSMENT — FIBROSIS 4 INDEX: FIB4 SCORE: 0.88

## 2024-08-09 NOTE — PROGRESS NOTES
RADIATION ONCOLOGY FOLLOW-UP    Patient name:  Luca Ocampo    Primary Physician:  Jose Juan Marroquin M.D. MRN: [id number]  [facility]: [id number]   Referring physician:  Jose Juan Schaffer M.D.  : 1965, 59 y.o.     DATE OF SERVICE: 2024    IDENTIFICATION:   A 59 y.o. male with  left meningioma s/p SRT 25Gy in 5 fx 23    RADIATION SUMMARY:  Radiation Oncology          2023   Aria Course Treatment Dates   Course First Treatment Date 2023    Course Last Treatment Date 2023    Aria Treatment Summary   1 Lesion SRT  Plan from Course C1_SRT   Fraction 4 of 5 5 of 5    5 of 5   Elapsed Course Days 6 @ [id number] 7 @ [id number]    7 @ [id number]   Prescribed Fraction Dose 500 cGy 500 cGy    500 cGy   Prescribed Total Dose 2,500 cGy 2,500 cGy    2,500 cGy   1 Lesion SRT  Reference Point from Course C1_SRT   Elapsed Course Days 6 @ [id number] 7 @ [id number]    7 @ [id number]   Session Dose 500 cGy 500 cGy    --   Total Dose 2,000 cGy 2,500 cGy    2,500 cGy   1 Lesion SRT CP  Reference Point from Course C1_SRT   Elapsed Course Days 6 @ [id number] 7 @ [id number]    7 @ [id number]   Session Dose 600 cGy 600 cGy    --   Total Dose 2,398 cGy 2,998 cGy    2,998 cGy      Details          More values are hidden. Newest values shown. Go to activity for more data.                HISTORY OF PRESENT ILLNESS:    Patient originally presented with some vertigo symptoms and had MRI done which showed a left frontal enhancing lesion and type T2 hyperintense in the left frontal cortex.  Patient had surveillance done and had interval MRI done 2022 which showed growth from 1.5 cm to 2 cm.  Patient was seen by Dr. Schaffer who felt it was in an unsafe location given proximity to [facility].  Given growth pattern patient was seen in multidiscipline clinic for consideration of radiosurgery.  Currently patient denies any headaches, nausea, vomiting or other gross  neurologic symptoms.  He says his vertigo symptoms have more or less resolved at this point.     2/13/23  Patient completed 25Gy in 5 fractions 1/18/23. Dex taper given headaches improving fatigue improving     7/21/23  Patient doing well has some tinnitus for which he sees his ENT doctor for that for started after radiation.  Has occasional blurred vision.  Overall doing well had MRI brain which shows stable mass no vasogenic edema.       INTERVAL HISTORY:  Patient doing well MRI brain August 2024 shows no evidence of disease recurrence stable and no T2 changes.  No headaches, nausea, vision changes.  Has some baseline memory issues minimally symptomatic.    PROBLEM LIST:  Patient Active Problem List   Diagnosis    Meningioma (HCC)       CURRENT MEDICATIONS:  Current Outpatient Medications   Medication Sig Dispense Refill    aspirin 81 MG EC tablet Take  by mouth every day.      B Complex Vitamins (VITAMIN B COMPLEX) Tab Take 1 Tablet by mouth every day.      vitamin D (VITAMIND D3) 1000 UNIT Tab Take 1,000 Units by mouth every day.      lysine 500 MG Tab Take 500 mg by mouth every day.       No current facility-administered medications for this encounter.       ALLERGIES:  Patient has no known allergies.    REVIEW OF SYSTEMS:    A complete review of system taken. Pertinent items in HPI.  All others negative.    PHYSICAL EXAM:  PERFORMANCE STATUS:  ECOG0    Physical Exam  Vitals reviewed.   HENT:      Head: Normocephalic.   Eyes:      Pupils: Pupils are equal, round, and reactive to light.   Cardiovascular:      Rate and Rhythm: Normal rate.   Pulmonary:      Effort: Pulmonary effort is normal.   Abdominal:      General: Abdomen is flat.   Musculoskeletal:         General: Normal range of motion.   Neurological:      General: No focal deficit present.      Mental Status: He is alert.   Psychiatric:         Mood and Affect: Mood normal.         LABORATORY DATA:   Lab Results   Component Value Date/Time    WBC 12.0  (H) 06/29/2023 04:43 AM    RBC 5.40 06/29/2023 04:43 AM    HEMOGLOBIN 15.8 06/29/2023 04:43 AM    HEMATOCRIT 47.2 06/29/2023 04:43 AM    MCV 87 06/29/2023 04:43 AM    MCH 29.3 06/29/2023 04:43 AM    MCHC 33.5 06/29/2023 04:43 AM    RDW 18.1 (H) 06/29/2023 04:43 AM    PLATELETCT 459 (H) 06/29/2023 04:43 AM    MPV 7.2 05/26/2021 09:15 AM    NEUTSPOLYS 72 06/29/2023 04:43 AM    LYMPHOCYTES 16 06/29/2023 04:43 AM    MONOCYTES 6 06/29/2023 04:43 AM    EOSINOPHILS 3 06/29/2023 04:43 AM    BASOPHILS 1 06/29/2023 04:43 AM      Lab Results   Component Value Date/Time    SODIUM 143 06/29/2023 04:43 AM    POTASSIUM 4.7 06/29/2023 04:43 AM    CHLORIDE 106 06/29/2023 04:43 AM    CO2 22 06/29/2023 04:43 AM    GLUCOSE 97 06/29/2023 04:43 AM    BUN 20 06/29/2023 04:43 AM    CREATININE 0.80 06/29/2023 04:43 AM    BUNCREATRAT 25 (H) 06/29/2023 04:43 AM         RADIOLOGY DATA:  MR-BRAIN-WITH & W/O    Result Date: 7/31/2024  Stable left parietal meningioma.      IMPRESSION:    59 y.o. male with  left meningioma s/p SRT 25Gy in 5 fx 1/18/23    CANCER STATUS:  Disease Stable    RECOMMENDATIONS:   Patient doing well with no evidence of disease.    Thank you for the opportunity to participate in his care.  If any questions or comments, please do not hesitate in calling.    Orders Placed This Encounter    MR-BRAIN-WITH & W/O